# Patient Record
Sex: FEMALE | Race: WHITE | URBAN - METROPOLITAN AREA
[De-identification: names, ages, dates, MRNs, and addresses within clinical notes are randomized per-mention and may not be internally consistent; named-entity substitution may affect disease eponyms.]

---

## 2021-08-03 ENCOUNTER — APPOINTMENT (RX ONLY)
Dept: URBAN - METROPOLITAN AREA CLINIC 27 | Facility: CLINIC | Age: 81
Setting detail: DERMATOLOGY
End: 2021-08-03

## 2021-08-03 DIAGNOSIS — L82.1 OTHER SEBORRHEIC KERATOSIS: ICD-10-CM

## 2021-08-03 DIAGNOSIS — D22 MELANOCYTIC NEVI: ICD-10-CM

## 2021-08-03 DIAGNOSIS — D18.0 HEMANGIOMA: ICD-10-CM

## 2021-08-03 DIAGNOSIS — L81.4 OTHER MELANIN HYPERPIGMENTATION: ICD-10-CM

## 2021-08-03 DIAGNOSIS — L57.8 OTHER SKIN CHANGES DUE TO CHRONIC EXPOSURE TO NONIONIZING RADIATION: ICD-10-CM

## 2021-08-03 PROBLEM — D18.01 HEMANGIOMA OF SKIN AND SUBCUTANEOUS TISSUE: Status: ACTIVE | Noted: 2021-08-03

## 2021-08-03 PROBLEM — D22.5 MELANOCYTIC NEVI OF TRUNK: Status: ACTIVE | Noted: 2021-08-03

## 2021-08-03 PROCEDURE — ? ADDITIONAL NOTES

## 2021-08-03 PROCEDURE — ? COUNSELING

## 2021-08-03 PROCEDURE — 99213 OFFICE O/P EST LOW 20 MIN: CPT

## 2021-08-03 PROCEDURE — ? FULL BODY SKIN EXAM

## 2021-08-03 PROCEDURE — ? SUNSCREEN RECOMMENDATIONS

## 2021-08-03 ASSESSMENT — LOCATION DETAILED DESCRIPTION DERM
LOCATION DETAILED: RIGHT INFERIOR ANTERIOR NECK
LOCATION DETAILED: LEFT LATERAL SUPERIOR CHEST
LOCATION DETAILED: LEFT ULNAR DORSAL HAND
LOCATION DETAILED: RIGHT SUPERIOR UPPER BACK
LOCATION DETAILED: LEFT MEDIAL BREAST 7-8:00 REGION
LOCATION DETAILED: RIGHT SUPERIOR LATERAL UPPER BACK
LOCATION DETAILED: PERIUMBILICAL SKIN
LOCATION DETAILED: RIGHT PROXIMAL PRETIBIAL REGION
LOCATION DETAILED: RIGHT ULNAR DORSAL HAND
LOCATION DETAILED: LEFT PROXIMAL PRETIBIAL REGION
LOCATION DETAILED: LEFT PROXIMAL POSTERIOR UPPER ARM
LOCATION DETAILED: LEFT SUPERIOR UPPER BACK
LOCATION DETAILED: RIGHT CENTRAL MALAR CHEEK
LOCATION DETAILED: RIGHT PROXIMAL POSTERIOR UPPER ARM
LOCATION DETAILED: RIGHT LATERAL BREAST 6-7:00 REGION

## 2021-08-03 ASSESSMENT — LOCATION ZONE DERM
LOCATION ZONE: NECK
LOCATION ZONE: LEG
LOCATION ZONE: HAND
LOCATION ZONE: FACE
LOCATION ZONE: TRUNK
LOCATION ZONE: ARM

## 2021-08-03 ASSESSMENT — LOCATION SIMPLE DESCRIPTION DERM
LOCATION SIMPLE: LEFT POSTERIOR UPPER ARM
LOCATION SIMPLE: RIGHT BREAST
LOCATION SIMPLE: RIGHT HAND
LOCATION SIMPLE: RIGHT BACK
LOCATION SIMPLE: RIGHT CHEEK
LOCATION SIMPLE: RIGHT UPPER BACK
LOCATION SIMPLE: LEFT BREAST
LOCATION SIMPLE: LEFT HAND
LOCATION SIMPLE: RIGHT PRETIBIAL REGION
LOCATION SIMPLE: ABDOMEN
LOCATION SIMPLE: LEFT PRETIBIAL REGION
LOCATION SIMPLE: RIGHT ANTERIOR NECK
LOCATION SIMPLE: CHEST
LOCATION SIMPLE: RIGHT POSTERIOR UPPER ARM
LOCATION SIMPLE: LEFT UPPER BACK

## 2021-08-03 NOTE — HPI: EVALUATION OF SKIN LESION(S)
What Type Of Note Output Would You Prefer (Optional)?: Bullet Format
Hpi Title: Evaluation of Skin Lesions
How Severe Are Your Spot(S)?: mild
Additional History: Patient states she’s getting new lesions all over body

## 2021-08-03 NOTE — PROCEDURE: FULL BODY SKIN EXAM
Detail Level: Generalized
Body Of Note (Please Add Your Own Text Here): Eyes, and outer lips look clear \\nFrontal scalp look clear
Price (Do Not Change): 0.00
Instructions: This plan will send the code FBSE to the PM system.  DO NOT or CHANGE the price.

## 2022-03-07 ENCOUNTER — APPOINTMENT (RX ONLY)
Dept: URBAN - METROPOLITAN AREA CLINIC 27 | Facility: CLINIC | Age: 82
Setting detail: DERMATOLOGY
End: 2022-03-07

## 2022-03-07 DIAGNOSIS — D18.0 HEMANGIOMA: ICD-10-CM

## 2022-03-07 DIAGNOSIS — L57.8 OTHER SKIN CHANGES DUE TO CHRONIC EXPOSURE TO NONIONIZING RADIATION: ICD-10-CM

## 2022-03-07 DIAGNOSIS — L82.1 OTHER SEBORRHEIC KERATOSIS: ICD-10-CM

## 2022-03-07 DIAGNOSIS — D22 MELANOCYTIC NEVI: ICD-10-CM

## 2022-03-07 DIAGNOSIS — L81.4 OTHER MELANIN HYPERPIGMENTATION: ICD-10-CM

## 2022-03-07 PROBLEM — D18.01 HEMANGIOMA OF SKIN AND SUBCUTANEOUS TISSUE: Status: ACTIVE | Noted: 2022-03-07

## 2022-03-07 PROBLEM — D22.5 MELANOCYTIC NEVI OF TRUNK: Status: ACTIVE | Noted: 2022-03-07

## 2022-03-07 PROCEDURE — 99213 OFFICE O/P EST LOW 20 MIN: CPT

## 2022-03-07 PROCEDURE — ? OBSERVATION AND MEASURE

## 2022-03-07 PROCEDURE — ? COUNSELING

## 2022-03-07 PROCEDURE — ? SUNSCREEN RECOMMENDATIONS

## 2022-03-07 ASSESSMENT — LOCATION ZONE DERM
LOCATION ZONE: ARM
LOCATION ZONE: NECK
LOCATION ZONE: HAND
LOCATION ZONE: FACE
LOCATION ZONE: TRUNK

## 2022-03-07 ASSESSMENT — LOCATION DETAILED DESCRIPTION DERM
LOCATION DETAILED: LEFT INFERIOR MEDIAL MALAR CHEEK
LOCATION DETAILED: RIGHT ULNAR DORSAL HAND
LOCATION DETAILED: LEFT ANTECUBITAL SKIN
LOCATION DETAILED: RIGHT SUPERIOR LATERAL NECK
LOCATION DETAILED: RIGHT INFERIOR CENTRAL MALAR CHEEK
LOCATION DETAILED: RIGHT ANTECUBITAL SKIN
LOCATION DETAILED: LEFT ANTERIOR SHOULDER
LOCATION DETAILED: SUPERIOR THORACIC SPINE
LOCATION DETAILED: UPPER STERNUM
LOCATION DETAILED: RIGHT MEDIAL UPPER BACK
LOCATION DETAILED: LEFT ULNAR DORSAL HAND

## 2022-03-07 ASSESSMENT — LOCATION SIMPLE DESCRIPTION DERM
LOCATION SIMPLE: LEFT CHEEK
LOCATION SIMPLE: RIGHT ANTERIOR NECK
LOCATION SIMPLE: RIGHT HAND
LOCATION SIMPLE: RIGHT UPPER ARM
LOCATION SIMPLE: LEFT SHOULDER
LOCATION SIMPLE: CHEST
LOCATION SIMPLE: UPPER BACK
LOCATION SIMPLE: LEFT HAND
LOCATION SIMPLE: LEFT UPPER ARM
LOCATION SIMPLE: RIGHT UPPER BACK
LOCATION SIMPLE: RIGHT CHEEK

## 2022-03-07 NOTE — PROCEDURE: OBSERVATION
Body Location Override (Optional - Billing Will Still Be Based On Selected Body Map Location If Applicable): left upper chest
Detail Level: Detailed
Size Of Lesion: 2cm

## 2022-08-04 ENCOUNTER — APPOINTMENT (RX ONLY)
Dept: URBAN - METROPOLITAN AREA CLINIC 27 | Facility: CLINIC | Age: 82
Setting detail: DERMATOLOGY
End: 2022-08-04

## 2022-08-04 DIAGNOSIS — D22 MELANOCYTIC NEVI: ICD-10-CM

## 2022-08-04 DIAGNOSIS — L81.4 OTHER MELANIN HYPERPIGMENTATION: ICD-10-CM

## 2022-08-04 DIAGNOSIS — L82.0 INFLAMED SEBORRHEIC KERATOSIS: ICD-10-CM

## 2022-08-04 DIAGNOSIS — D18.0 HEMANGIOMA: ICD-10-CM

## 2022-08-04 DIAGNOSIS — L82.1 OTHER SEBORRHEIC KERATOSIS: ICD-10-CM

## 2022-08-04 DIAGNOSIS — D485 NEOPLASM OF UNCERTAIN BEHAVIOR OF SKIN: ICD-10-CM

## 2022-08-04 DIAGNOSIS — L91.8 OTHER HYPERTROPHIC DISORDERS OF THE SKIN: ICD-10-CM

## 2022-08-04 PROBLEM — D22.5 MELANOCYTIC NEVI OF TRUNK: Status: ACTIVE | Noted: 2022-08-04

## 2022-08-04 PROBLEM — D18.01 HEMANGIOMA OF SKIN AND SUBCUTANEOUS TISSUE: Status: ACTIVE | Noted: 2022-08-04

## 2022-08-04 PROBLEM — D48.5 NEOPLASM OF UNCERTAIN BEHAVIOR OF SKIN: Status: ACTIVE | Noted: 2022-08-04

## 2022-08-04 PROCEDURE — ? FULL BODY SKIN EXAM

## 2022-08-04 PROCEDURE — ? MEDICARE ABN

## 2022-08-04 PROCEDURE — 99213 OFFICE O/P EST LOW 20 MIN: CPT | Mod: 25

## 2022-08-04 PROCEDURE — ? COUNSELING

## 2022-08-04 PROCEDURE — ? SUNSCREEN RECOMMENDATIONS

## 2022-08-04 PROCEDURE — ? LIQUID NITROGEN

## 2022-08-04 PROCEDURE — ? BIOPSY BY SHAVE METHOD

## 2022-08-04 PROCEDURE — 17110 DESTRUCTION B9 LES UP TO 14: CPT

## 2022-08-04 PROCEDURE — 11102 TANGNTL BX SKIN SINGLE LES: CPT | Mod: 59

## 2022-08-04 ASSESSMENT — LOCATION SIMPLE DESCRIPTION DERM
LOCATION SIMPLE: RIGHT HAND
LOCATION SIMPLE: RIGHT UPPER BACK
LOCATION SIMPLE: LEFT CHEEK
LOCATION SIMPLE: UPPER BACK
LOCATION SIMPLE: LEFT THIGH
LOCATION SIMPLE: LEFT HAND
LOCATION SIMPLE: POSTERIOR NECK

## 2022-08-04 ASSESSMENT — LOCATION DETAILED DESCRIPTION DERM
LOCATION DETAILED: LEFT INFERIOR MEDIAL MALAR CHEEK
LOCATION DETAILED: RIGHT RADIAL DORSAL HAND
LOCATION DETAILED: INFERIOR THORACIC SPINE
LOCATION DETAILED: LEFT RADIAL DORSAL HAND
LOCATION DETAILED: LEFT MEDIAL MALAR CHEEK
LOCATION DETAILED: RIGHT MEDIAL UPPER BACK
LOCATION DETAILED: SUPERIOR THORACIC SPINE
LOCATION DETAILED: LEFT INFERIOR POSTERIOR NECK
LOCATION DETAILED: LEFT ANTERIOR MEDIAL PROXIMAL THIGH

## 2022-08-04 ASSESSMENT — LOCATION ZONE DERM
LOCATION ZONE: TRUNK
LOCATION ZONE: HAND
LOCATION ZONE: NECK
LOCATION ZONE: LEG
LOCATION ZONE: FACE

## 2022-08-04 NOTE — PROCEDURE: BIOPSY BY SHAVE METHOD
Detail Level: Detailed
Depth Of Biopsy: dermis
Was A Bandage Applied: Yes
Size Of Lesion In Cm: 0
Biopsy Type: H and E
Biopsy Method: Personna blade
Anesthesia Type: 1% lidocaine without epinephrine
Anesthesia Volume In Cc (Will Not Render If 0): 0.5
Hemostasis: Aluminum Chloride
Wound Care: Aquaphor
Dressing: bandage
Destruction After The Procedure: No
Type Of Destruction Used: Curettage
Curettage Text: The wound bed was treated with curettage after the biopsy was performed.
Cryotherapy Text: The wound bed was treated with cryotherapy after the biopsy was performed.
Electrodesiccation Text: The wound bed was treated with electrodesiccation after the biopsy was performed.
Electrodesiccation And Curettage Text: The wound bed was treated with electrodesiccation and curettage after the biopsy was performed.
Silver Nitrate Text: The wound bed was treated with silver nitrate after the biopsy was performed.
Lab: [id number]
[id number]
Lab Facility: [id number]
Consent: Written consent was obtained and risks were reviewed including but not limited to scarring, infection, bleeding, scabbing, incomplete removal, nerve damage and allergy to anesthesia.
Post-Care Instructions: I reviewed with the patient in detail post-care instructions. Patient is to keep the biopsy site dry overnight, and then apply aquaphor twice daily until healed.
Notification Instructions: Patient will be notified of biopsy results. However, patient instructed to call the office if not contacted within 2 weeks.
Billing Type: Third-Party Bill
Information: Selecting Yes will display possible errors in your note based on the variables you have selected. This validation is only offered as a suggestion for you. PLEASE NOTE THAT THE VALIDATION TEXT WILL BE REMOVED WHEN YOU FINALIZE YOUR NOTE. IF YOU WANT TO FAX A PRELIMINARY NOTE YOU WILL NEED TO TOGGLE THIS TO 'NO' IF YOU DO NOT WANT IT IN YOUR FAXED NOTE.

## 2022-08-04 NOTE — PROCEDURE: LIQUID NITROGEN
Show Aperture Variable?: Yes
Number Of Freeze-Thaw Cycles: 1 freeze-thaw cycle
Render Note In Bullet Format When Appropriate: No
Medical Necessity Clause: This procedure was medically necessary because the lesions that were treated were:
Medical Necessity Information: It is in your best interest to select a reason for this procedure from the list below. All of these items fulfill various CMS LCD requirements except the new and changing color options.
Post-Care Instructions: I reviewed with the patient in detail post-care instructions. Patient is to wear sunprotection, and avoid picking at any of the treated lesions. Pt may apply Vaseline to crusted or scabbing areas.
Duration Of Freeze Thaw-Cycle (Seconds): 5-10
Detail Level: Detailed
Spray Paint Text: The liquid nitrogen was applied to the skin utilizing a spray paint frosting technique.
Consent: The patient's consent was obtained including but not limited to risks of crusting, scabbing, blistering, scarring, darker or lighter pigmentary change, recurrence, incomplete removal and infection.

## 2024-02-22 ENCOUNTER — APPOINTMENT (RX ONLY)
Dept: URBAN - METROPOLITAN AREA CLINIC 27 | Facility: CLINIC | Age: 84
Setting detail: DERMATOLOGY
End: 2024-02-22

## 2024-02-22 DIAGNOSIS — L82.0 INFLAMED SEBORRHEIC KERATOSIS: ICD-10-CM

## 2024-02-22 DIAGNOSIS — L57.0 ACTINIC KERATOSIS: ICD-10-CM

## 2024-02-22 PROCEDURE — ? COUNSELING

## 2024-02-22 PROCEDURE — 99213 OFFICE O/P EST LOW 20 MIN: CPT

## 2024-02-22 PROCEDURE — ? DEFER

## 2024-02-22 ASSESSMENT — LOCATION DETAILED DESCRIPTION DERM
LOCATION DETAILED: LEFT MEDIAL FRONTAL SCALP
LOCATION DETAILED: LEFT CENTRAL MALAR CHEEK

## 2024-02-22 ASSESSMENT — LOCATION SIMPLE DESCRIPTION DERM
LOCATION SIMPLE: LEFT SCALP
LOCATION SIMPLE: LEFT CHEEK

## 2024-02-22 ASSESSMENT — LOCATION ZONE DERM
LOCATION ZONE: SCALP
LOCATION ZONE: FACE

## 2024-02-22 NOTE — PROCEDURE: DEFER
Procedure To Be Performed At Next Visit: Liquid nitrogen
X Size Of Lesion In Cm (Optional): 0
Introduction Text (Please End With A Colon): The following procedure was deferred:
Detail Level: Zone

## 2024-06-27 ENCOUNTER — APPOINTMENT (RX ONLY)
Dept: URBAN - METROPOLITAN AREA CLINIC 27 | Facility: CLINIC | Age: 84
Setting detail: DERMATOLOGY
End: 2024-06-27

## 2024-06-27 DIAGNOSIS — L82.1 OTHER SEBORRHEIC KERATOSIS: ICD-10-CM

## 2024-06-27 PROCEDURE — ? COUNSELING

## 2024-06-27 PROCEDURE — 99212 OFFICE O/P EST SF 10 MIN: CPT

## 2024-06-27 ASSESSMENT — LOCATION SIMPLE DESCRIPTION DERM: LOCATION SIMPLE: LABIA MAJORA

## 2024-06-27 ASSESSMENT — LOCATION DETAILED DESCRIPTION DERM: LOCATION DETAILED: LEFT LABIUM MAJUS

## 2024-06-27 ASSESSMENT — LOCATION ZONE DERM: LOCATION ZONE: VULVA

## 2024-07-31 ENCOUNTER — APPOINTMENT (RX ONLY)
Dept: URBAN - METROPOLITAN AREA CLINIC 27 | Facility: CLINIC | Age: 84
Setting detail: DERMATOLOGY
End: 2024-07-31

## 2024-07-31 DIAGNOSIS — Z12.83 ENCOUNTER FOR SCREENING FOR MALIGNANT NEOPLASM OF SKIN: ICD-10-CM

## 2024-07-31 DIAGNOSIS — D18.0 HEMANGIOMA: ICD-10-CM

## 2024-07-31 DIAGNOSIS — L57.8 OTHER SKIN CHANGES DUE TO CHRONIC EXPOSURE TO NONIONIZING RADIATION: ICD-10-CM

## 2024-07-31 DIAGNOSIS — L82.1 OTHER SEBORRHEIC KERATOSIS: ICD-10-CM

## 2024-07-31 DIAGNOSIS — Z00.00 ENCOUNTER FOR GENERAL ADULT MEDICAL EXAMINATION WITHOUT ABNORMAL FINDINGS: ICD-10-CM

## 2024-07-31 DIAGNOSIS — D22 MELANOCYTIC NEVI: ICD-10-CM

## 2024-07-31 DIAGNOSIS — L82.0 INFLAMED SEBORRHEIC KERATOSIS: ICD-10-CM

## 2024-07-31 DIAGNOSIS — Z71.89 OTHER SPECIFIED COUNSELING: ICD-10-CM

## 2024-07-31 DIAGNOSIS — L81.4 OTHER MELANIN HYPERPIGMENTATION: ICD-10-CM

## 2024-07-31 PROBLEM — D18.01 HEMANGIOMA OF SKIN AND SUBCUTANEOUS TISSUE: Status: ACTIVE | Noted: 2024-07-31

## 2024-07-31 PROBLEM — D22.5 MELANOCYTIC NEVI OF TRUNK: Status: ACTIVE | Noted: 2024-07-31

## 2024-07-31 PROCEDURE — 17110 DESTRUCTION B9 LES UP TO 14: CPT

## 2024-07-31 PROCEDURE — 99213 OFFICE O/P EST LOW 20 MIN: CPT | Mod: 25

## 2024-07-31 PROCEDURE — ? COUNSELING

## 2024-07-31 PROCEDURE — ? LIQUID NITROGEN

## 2024-07-31 ASSESSMENT — LOCATION DETAILED DESCRIPTION DERM
LOCATION DETAILED: LEFT ANTERIOR SHOULDER
LOCATION DETAILED: RIGHT ANTERIOR SHOULDER
LOCATION DETAILED: RIGHT MEDIAL SUPERIOR CHEST
LOCATION DETAILED: RIGHT MEDIAL UPPER BACK
LOCATION DETAILED: LEFT SUPERIOR UPPER BACK
LOCATION DETAILED: LEFT CLAVICULAR NECK
LOCATION DETAILED: EPIGASTRIC SKIN
LOCATION DETAILED: RIGHT CLAVICULAR NECK
LOCATION DETAILED: LEFT LABIUM MAJUS
LOCATION DETAILED: STERNAL NOTCH

## 2024-07-31 ASSESSMENT — LOCATION ZONE DERM
LOCATION ZONE: NECK
LOCATION ZONE: TRUNK
LOCATION ZONE: VULVA
LOCATION ZONE: ARM

## 2024-07-31 ASSESSMENT — LOCATION SIMPLE DESCRIPTION DERM
LOCATION SIMPLE: LEFT UPPER BACK
LOCATION SIMPLE: LEFT SHOULDER
LOCATION SIMPLE: RIGHT SHOULDER
LOCATION SIMPLE: LEFT ANTERIOR NECK
LOCATION SIMPLE: RIGHT UPPER BACK
LOCATION SIMPLE: LABIA MAJORA
LOCATION SIMPLE: CHEST
LOCATION SIMPLE: RIGHT ANTERIOR NECK
LOCATION SIMPLE: ABDOMEN

## 2024-07-31 NOTE — PROCEDURE: LIQUID NITROGEN
Show Topical Anesthesia Variable?: Yes
Post-Care Instructions: I reviewed with the patient in detail post-care instructions. Patient is to wear sunprotection, and avoid picking at any of the treated lesions. Pt may apply Vaseline to crusted or scabbing areas.
Detail Level: Simple
Application Tool (Optional): Liquid Nitrogen Sprayer
Medical Necessity Clause: This procedure was medically necessary because the lesions that were treated were:
Duration Of Freeze Thaw-Cycle (Seconds): 3
Spray Paint Text: The liquid nitrogen was applied to the skin utilizing a spray paint frosting technique.
Number Of Freeze-Thaw Cycles: 3 freeze-thaw cycles
Spray Paint Technique: No
Medical Necessity Information: It is in your best interest to select a reason for this procedure from the list below. All of these items fulfill various CMS LCD requirements except the new and changing color options.
Consent: The patient's consent was obtained including but not limited to risks of crusting, scabbing, blistering, scarring, darker or lighter pigmentary change, recurrence, incomplete removal and infection.

## 2025-01-20 NOTE — HPS.HSE
"Family Physician"~"-"~"-: "~"Family Physician:  Prachi Sandra"~"Chief Complaint"~"-"~"-: "~"Coronary artery disease. Chest pain. "~"History of Present Illness"~"-: "~"The patient is an 84 year old  female presenting today for recent chest pain. She describes her chest pain as a left sided 'discomfort' that is exacerbated by certain positions in bed. She reports that her chest pain is relieved with "~"[place] anxiolytic and homeopathic medications as needed. She also reports chronic exertional dyspnea, which has notably worsened over the last few weeks. She does have a history of moderate LAD disease as seen on her most recent cardiac "~"catheterization in 2021. The patient has been complaining of symptoms of raising concern for progressive coronary artery disease for quite some time but has refused an ischemia work-up in the recent past. Given the recent progression of her "~"symptoms, she is now agreeable to proceed with a left cardiac catheterization. She denies any current complaints today such as chest pain and shortness of breath at rest, nausea, vomiting, diarrhea, lightheadedness, dizziness, cough, sore throat, or "~"fever. "~"Medical History"~"Past Medical History"~"Past Medical History: Reports Other"~"Additional Past Medical History: "~"1. Coronary artery disease. "~"2. Chest pain. "~"3. Dyspnea on exertion. "~"4. Hypertension. "~"5. Dyslipidemia. "~"6. Paroxysmal atrial fibrillation, pharmacological therapy with Sotalol and oral anticoagulation with Eliquis. "~"7. Venous varicosities. "~"8. Hemorrhoids. "~"9. Chronic constipation. "~"10. Migraines. "~"11. Vertigo. "~"12. Chronic low back pain. "~"13. Osteoarthritis. "~"14. Hypothyroidism. "~"15. Anxiety. "~"Past Surgical History: Reports Other"~"Additional Past Surgical History: "~"1. Cardiac catheterization. "~"2. Hemorrhoidectomy. "~"Social History"~"Tobacco: Non-smoker"~"Alcohol: None"~"Personal: Single"~"Living: Other (She reportedly lives in a subsidized home independently. )"~"Family History"~"Family History: Not pertinent"~"Allergies / Home Medications"~"Allergy/Medication List: "~"Home medications: "~"1. Apixaban 5 mg p.o. twice a day. "~"2. Vitamin C 1000 mg p.o. daily. "~"3. Aspirin 81 mg p.o. daily. "~"4. Cholecalciferol 50 mcg p.o. daily. "~"5. DHEA 1 dose p.o. daily. "~"6. Colace 100 mg p.o. three times a day. "~"7. Lactulose 20 g p.o. daily. "~"8. Levothyroxine 50 mcg p.o. daily. "~"9. Losartan 25 mg p.o. daily. "~"10. Magnesium 400 mg p.o. daily. "~"11. Pumpkin seed extract 1 dose p.o. daily. "~"12. Rosuvastatin 10 mg p.o. daily. "~"13. Sotalol 80 mg p.o. twice a day. "~"14. Vitamin K2 1 dose p.o. daily. "~"15. Zinc 1 dose p.o. daily. "~"Allergies: Ibuprofen. Penicillin. Procaine. "~"Review of Systems"~"-"~"A 12 point ROS was completed and negative except as noted: Yes"~"Physical Exam"~"Vital Signs"~"-: "~"Blood pressure 162/87. Heart rate 62. Respirations 18. Pulse ox 98% on room air. "~"Height 5 feet, 1.5 inches. Weight 67.8 kg. BMI 27.8. "~"Physical Exam"~"General: Well Developed, Well Nourished and No Apparent Distress"~"HEENT: NormoCephalic, Moist mucous membranes, Atraumatic and PERRLA"~"Respiratory: Clear"~"Cardiac: Regular Rhythm (with occasional ectopy. )"~"GI: Soft, Non Tender and Non Distended"~"Musculoskeletal: No Edema and Normal Gait & Station"~"Skin: Warm and Dry"~"Neuro: AO x 3 and Nonfocal/grossly intact"~"Laboratory Results"~"-"~"-: "~"DIAGNOSTIC STUDIES as of 01/20/2024: White blood cell count 6.4. Hemoglobin 14.8. Platelet count 187,000. Sodium 138. Potassium 4.3. BUN 17. Creatinine 0.7. Glucose 92. Calcium 9.3. AST 30. ALT 25. Albumin 4.4. "~"EKG 01/20/2025: Sinus rhythm with premature supraventricular complexes. Left axis deviation. Low voltage QRS. "~"Impression/Plan"~"-"~"-: "~"IMPRESSION/PLAN: "~"1. Coronary artery disease and chest pain: The patient is in need of a left cardiac catheterization with Dr. Mirza Alex on 01/21/2025. The benefits and risks of the procedure have been explained to the patient. The patient understands these "~"risks and wishes to proceed. She is aware to hold her Eliquis starting 01/19/2025. While holding her Eliquis, she will start Aspirin in preparation for her procedure. She will continue Aspirin daily, up to and including the morning of her "~"catheterization. "

## 2025-01-21 ENCOUNTER — HOSPITAL ENCOUNTER (OUTPATIENT)
Dept: HOSPITAL 99 - CATH | Age: 85
Discharge: HOME | End: 2025-01-21
Payer: MEDICARE

## 2025-01-21 VITALS — SYSTOLIC BLOOD PRESSURE: 130 MMHG | DIASTOLIC BLOOD PRESSURE: 75 MMHG

## 2025-01-21 VITALS — SYSTOLIC BLOOD PRESSURE: 133 MMHG | DIASTOLIC BLOOD PRESSURE: 69 MMHG

## 2025-01-21 VITALS — DIASTOLIC BLOOD PRESSURE: 65 MMHG | SYSTOLIC BLOOD PRESSURE: 132 MMHG

## 2025-01-21 VITALS
DIASTOLIC BLOOD PRESSURE: 87 MMHG | RESPIRATION RATE: 12 BRPM | SYSTOLIC BLOOD PRESSURE: 152 MMHG | OXYGEN SATURATION: 2 %

## 2025-01-21 VITALS — SYSTOLIC BLOOD PRESSURE: 134 MMHG | DIASTOLIC BLOOD PRESSURE: 67 MMHG

## 2025-01-21 VITALS — DIASTOLIC BLOOD PRESSURE: 75 MMHG | SYSTOLIC BLOOD PRESSURE: 153 MMHG

## 2025-01-21 VITALS — SYSTOLIC BLOOD PRESSURE: 121 MMHG | DIASTOLIC BLOOD PRESSURE: 69 MMHG

## 2025-01-21 VITALS — SYSTOLIC BLOOD PRESSURE: 119 MMHG | DIASTOLIC BLOOD PRESSURE: 71 MMHG

## 2025-01-21 VITALS — DIASTOLIC BLOOD PRESSURE: 77 MMHG | SYSTOLIC BLOOD PRESSURE: 110 MMHG

## 2025-01-21 VITALS — SYSTOLIC BLOOD PRESSURE: 123 MMHG | DIASTOLIC BLOOD PRESSURE: 72 MMHG

## 2025-01-21 VITALS — SYSTOLIC BLOOD PRESSURE: 124 MMHG | DIASTOLIC BLOOD PRESSURE: 60 MMHG

## 2025-01-21 VITALS — SYSTOLIC BLOOD PRESSURE: 120 MMHG | DIASTOLIC BLOOD PRESSURE: 66 MMHG

## 2025-01-21 VITALS — DIASTOLIC BLOOD PRESSURE: 87 MMHG | SYSTOLIC BLOOD PRESSURE: 152 MMHG

## 2025-01-21 VITALS — SYSTOLIC BLOOD PRESSURE: 139 MMHG | DIASTOLIC BLOOD PRESSURE: 72 MMHG

## 2025-01-21 VITALS — DIASTOLIC BLOOD PRESSURE: 63 MMHG | SYSTOLIC BLOOD PRESSURE: 107 MMHG

## 2025-01-21 VITALS — DIASTOLIC BLOOD PRESSURE: 70 MMHG | SYSTOLIC BLOOD PRESSURE: 124 MMHG

## 2025-01-21 VITALS — SYSTOLIC BLOOD PRESSURE: 134 MMHG | DIASTOLIC BLOOD PRESSURE: 76 MMHG

## 2025-01-21 VITALS — DIASTOLIC BLOOD PRESSURE: 75 MMHG | SYSTOLIC BLOOD PRESSURE: 119 MMHG

## 2025-01-21 VITALS — SYSTOLIC BLOOD PRESSURE: 125 MMHG | DIASTOLIC BLOOD PRESSURE: 70 MMHG

## 2025-01-21 VITALS — SYSTOLIC BLOOD PRESSURE: 119 MMHG | DIASTOLIC BLOOD PRESSURE: 69 MMHG

## 2025-01-21 VITALS — SYSTOLIC BLOOD PRESSURE: 135 MMHG | DIASTOLIC BLOOD PRESSURE: 67 MMHG

## 2025-01-21 VITALS — DIASTOLIC BLOOD PRESSURE: 70 MMHG | SYSTOLIC BLOOD PRESSURE: 129 MMHG

## 2025-01-21 VITALS — BODY MASS INDEX: 27.8 KG/M2

## 2025-01-21 DIAGNOSIS — I48.0: ICD-10-CM

## 2025-01-21 DIAGNOSIS — Z79.890: ICD-10-CM

## 2025-01-21 DIAGNOSIS — Z79.01: ICD-10-CM

## 2025-01-21 DIAGNOSIS — Z87.19: ICD-10-CM

## 2025-01-21 DIAGNOSIS — I25.10: Primary | ICD-10-CM

## 2025-01-21 DIAGNOSIS — Z79.02: ICD-10-CM

## 2025-01-21 DIAGNOSIS — F41.9: ICD-10-CM

## 2025-01-21 DIAGNOSIS — R42: ICD-10-CM

## 2025-01-21 DIAGNOSIS — E03.9: ICD-10-CM

## 2025-01-21 DIAGNOSIS — E78.5: ICD-10-CM

## 2025-01-21 DIAGNOSIS — Z88.6: ICD-10-CM

## 2025-01-21 DIAGNOSIS — G89.29: ICD-10-CM

## 2025-01-21 DIAGNOSIS — I83.90: ICD-10-CM

## 2025-01-21 DIAGNOSIS — K59.09: ICD-10-CM

## 2025-01-21 DIAGNOSIS — Z79.899: ICD-10-CM

## 2025-01-21 DIAGNOSIS — Z88.0: ICD-10-CM

## 2025-01-21 DIAGNOSIS — Z79.82: ICD-10-CM

## 2025-01-21 DIAGNOSIS — G43.909: ICD-10-CM

## 2025-01-21 DIAGNOSIS — I10: ICD-10-CM

## 2025-01-21 DIAGNOSIS — R07.89: ICD-10-CM

## 2025-01-21 DIAGNOSIS — M19.90: ICD-10-CM

## 2025-01-21 LAB — ACT BLD: > 397 SECONDS (ref 116–155)

## 2025-01-21 PROCEDURE — 93799 UNLISTED CV SVC/PROCEDURE: CPT

## 2025-01-21 PROCEDURE — 99153 MOD SED SAME PHYS/QHP EA: CPT

## 2025-01-21 PROCEDURE — C1753 CATH, INTRAVAS ULTRASOUND: HCPCS

## 2025-01-21 PROCEDURE — C1725 CATH, TRANSLUMIN NON-LASER: HCPCS

## 2025-01-21 PROCEDURE — C9600 PERC DRUG-EL COR STENT SING: HCPCS

## 2025-01-21 PROCEDURE — C1769 GUIDE WIRE: HCPCS

## 2025-01-21 PROCEDURE — 99152 MOD SED SAME PHYS/QHP 5/>YRS: CPT

## 2025-01-21 PROCEDURE — 92978 ENDOLUMINL IVUS OCT C 1ST: CPT

## 2025-01-21 PROCEDURE — C1874 STENT, COATED/COV W/DEL SYS: HCPCS

## 2025-01-21 PROCEDURE — C1894 INTRO/SHEATH, NON-LASER: HCPCS

## 2025-01-21 RX ADMIN — SODIUM CHLORIDE 1000: 900 INJECTION, SOLUTION INTRAVENOUS at 10:48

## 2025-01-21 RX ADMIN — ASPIRIN 243 MG: 81 TABLET, CHEWABLE ORAL at 08:00

## 2025-01-21 NOTE — ITS.CL.PN
"Cath Lab - Procedure Note"~"Procedure"~"Procedure Note: "~"CARDIAC CATHETERIZATION REPORT"~"Date of Procedure: 1/21/2025"~"Referring: Dr. Yonis Gonzalez MD"~"Indication: atypical chest pain"~" "~"PROCEDURE(S)"~"1. left heart catheterization"~"2. coronary angiography"~"3. iFR LAD"~"4. IVUS LAD"~"5. PCI with LELAND to LAD"~" "~"ACCESS: 6F right radial artery (closure: radial band)"~" "~"CATHETERS"~"1. 6F JR4"~"2. 6F JL3.5"~"3. 6F EBU 3.5 guide catheter"~"MODERATE SEDATION: 64 minutes of moderate sedation was utilized. An independent medical observer was present to assist with and help manage the patient's level of consciousness and physiologic status."~"ULTRASOUND GUIDED VASCULAR ACCESS (right radial artery): Ultrasound was utilized for vascular access. The vessel was visualized under ultrasound and noted to be patent. An image of the vessel was stored permanently in the patient's medical record. "~"Under direct ultrasound guidance, vascular access was obtained using a modified Seldinger technique and a 6 [place] sheath was placed."~"HEMODYNAMIC DATA"~"/10 (EDP 21) mmHg"~"/74 (mean 107) mmHg"~"CORONARY ANGIOGRAPHY"~"Dominance: right"~"LM: large, normal"~"LAD: large vessel giving rise to a small D1, small D2, moderate caliber D3, and small D4. There is a long segment of mild disease extending from just after D2 past D3. There is a focal area with ~60% stenosis just after D3. This was further "~"interrogated by iFR."~"LCx: large vessel giving rise to a large OM1 and moderate caliber OM2. There are mild luminal irregularities."~"RCA: Large vessel giving rise to a large RPDA and several small RPL branches.  There is mild nonobstructive disease."~" "~"iFR of mid LAD"~"An Omni wire was flushed and zeroed outside the body and then advanced to the left main. The wire introducer was removed and the catheter flushed with saline, after which pressure of the wire and guide were normalized. The wire was advanced to the "~"mid LAD and iFR recorded at 0.88, 0.88, and 0.88. iFR pullback was performed noting a focal pattern at the location of the severe stenosis with a minor contribution from the diffuse proximal disease. On return to the left main, iFR appropriately "~"normalized to ~1.0, confirming lack of wire drift."~"IVUS guided PCI with LELAND to mid LAD"~"Heparin was administered to achieve ACT greater than 300. Over the pressure wire used for iFR, a 2.0x12 mm balloon was advanced to the site of the lesion and inflated to nominal pressure with full expansion. IVUS was performed and demonstrated a 2.5 "~"mm distal reference vessel diameter and mild calcification. A 2.5x12 mm Jose [place] drug-eluting stent was deployed at nominal pressure, followed by post dilation of the entire stent save the distal edge with a 2.5x8 mm NC balloon to 16 oma.  "~"Final IVUS demonstrated full stent expansion, appropriate sizing, and no edge dissection.  Final angiographic result was outstanding.  The wire and guide were removed and a TR band placed.  The patient was loaded with 600 mg of Plavix."~"CONCLUSIONS"~"1.  Single-vessel obstructive coronary artery disease as described, with iFR positive stenosis in the mid-LAD."~"2.  Mildly elevated LV filling pressure no aortic stenosis"~"3.  Successful IVUS guided PCI of the mid LAD with 2.5 x 12 mm Jose [place] drug-eluting stent postdilated to high-pressure with a 2.5 mm NC balloon"~"RECOMMENDATIONS"~"1. expectant management after cardiac catheterization via right radial approach"~"2. SAPT with Plavix while on Eliquis. After 6 months patient can be transition to aspirin and Eliquis. Should patient stop taking Eliquis before 6 months, they should be transitioned to DAPT with aspirin and Plavix."~"3. aggressive secondary prevention of coronary artery disease"~" "~"Copy to: Dr. Yonis Gonzalez MD (cardiologist); Dr. Prachi Sandra MD (PCP)"~" "~"Signed: Mirza Alex MD, PhD"

## 2025-01-21 NOTE — W.PN.UPDATE
"Update Note"~"Progress Note Update"~"-: "~"83 yo WF s/p PCI LAD x1 LELAND (same day). She denies cp, sob, erika diet, voiding, amb w/o dizziness, R rad site c/d/i, EKG no ST changes SR. She will resume Eliquis tonight with Plavix for at least 6 mo, reinforced compliance. We will increase "~"rosuvastatin to 20mg daily, she was not consistently taking this med but agrees to take now. Cardiac rehab c/s. She will f/u Dr. Gonzalez in 2 weeks. She is for d/c home after 4pm.."~"CONCLUSIONS"~"1.  Single-vessel obstructive coronary artery disease as described, with iFR positive stenosis in the mid-LAD."~"2.  Mildly elevated LV filling pressure no aortic stenosis"~"3.  Successful IVUS guided PCI of the mid LAD with 2.5 x 12 mm Jose [place] drug-eluting stent postdilated to high-pressure with a 2.5 mm NC balloon"~"RECOMMENDATIONS"~"1. expectant management after cardiac catheterization via right radial approach"~"2. SAPT with Plavix while on Eliquis. After 6 months patient can be transition to aspirin and Eliquis. Should patient stop taking Eliquis before 6 months, they should be transitioned to DAPT with aspirin and Plavix."~"3. aggressive secondary prevention of coronary artery disease"~" "~"Copy to: Dr. Yonis Gonzalez MD (cardiologist); Dr. Prachi Sandra MD (PCP)"